# Patient Record
Sex: MALE | Race: BLACK OR AFRICAN AMERICAN | NOT HISPANIC OR LATINO
[De-identification: names, ages, dates, MRNs, and addresses within clinical notes are randomized per-mention and may not be internally consistent; named-entity substitution may affect disease eponyms.]

---

## 2019-06-26 ENCOUNTER — APPOINTMENT (OUTPATIENT)
Dept: PODIATRY | Facility: CLINIC | Age: 64
End: 2019-06-26
Payer: COMMERCIAL

## 2019-06-26 VITALS
SYSTOLIC BLOOD PRESSURE: 140 MMHG | DIASTOLIC BLOOD PRESSURE: 80 MMHG | HEIGHT: 75 IN | BODY MASS INDEX: 27.35 KG/M2 | WEIGHT: 220 LBS

## 2019-06-26 DIAGNOSIS — Z78.9 OTHER SPECIFIED HEALTH STATUS: ICD-10-CM

## 2019-06-26 PROBLEM — Z00.00 ENCOUNTER FOR PREVENTIVE HEALTH EXAMINATION: Status: ACTIVE | Noted: 2019-06-26

## 2019-06-26 PROCEDURE — L3000: CPT | Mod: LT

## 2019-06-26 PROCEDURE — 99204 OFFICE O/P NEW MOD 45 MIN: CPT | Mod: 25

## 2019-06-26 NOTE — HISTORY OF PRESENT ILLNESS
[FreeTextEntry1] : Location: right heel\par Duration: about 6 months\par Acute: \par Chronic: yes\par Past Tx: nothing, occaisional tylenol\par Exacerbated by: PDS, work, weight bearing\par

## 2019-06-26 NOTE — PROCEDURE
[FreeTextEntry1] : I had a lengthy and informative discussion with the patient today regarding plantar fasciitis. I explained to the patient that there are several etiologies as well contributing factors to this problem as well as what can aggravate it. It could include the presence or lack of of a heel spur, the type of foot type they have, the way they walk, it also could be related to the type of shoes they wear. I also made them understand that it could be a combination of all these problems together. I explained etiologies treatment options both conservative and surgical. I gave educational literature regarding this problem. Some of the treatment options as they range from conservative to aggressive include over-the-counter anti-inflammatories, prescription anti-inflammatories, custom shoes, custom orthotics, steroid injection, physical therapy, night splints, orthotripsy, and NSAIDS. There is also possible surgical intervention if all conservative measures failed to alleviate the problem. I did explain to the patient the type of shoe gear this should be wearing and gave him a copy of my plantar fascia stretching exercises with instructions to ice afterwards.\par i have dispensed a pair of heel cushions to the patient and advised the patient that accommodative support as well as elevation of both heels to help reduce symptoms\par A complete and thorough evaluation of the type of shoes they should be wearing and type of shoes for this time of year was discussed with patient.\par \par During the evaluation and management I had a lengthy discussion with the patient regarding benefits of functional foot orthoses. I explained to the patient the etiology and treatment options and one of them included the offloading and balancing of the painful portion of the foot. I explained the importance of balancing in offloading the painful area as part of the overall treatment process to advance healing. I have asked the patient to consider this as part of the treatment\par An overall gait examination was performed where the rearfoot and the midfoot and forefoot was evaluated both with the patient walking away and then towards me. then again repeated on their toes and their heels. I then explained my findings to the patient and then may benefit from a pair of orthotics and how the orthotics would control their symptoms\par a complete and comprehensive lower extremity biomechanical exam was performed., I evaluated the rear foot the subtalar joint the midfoot and forefoot during the comprehensive gait evaluation, muscle strength as well as muscle strength testing was incorporated into my findings when evaluating the lower extremity biomechanics., my findings were discussed reviewed and explained to the patient, I do think the patient would benefit from a pair of custom molded foot orthoses I have reviewed the benefits of the orthotics and advised they would benefit and have recommended they proceed with fabrication\par After a lengthy discussion with the patient regarding the possible benefits of orthotics and what we hope to achieve with them as it relates to their diagnosis the patient has agreed to be casted for the devices, The patient was then casted for a pair of custom functional foot orthoses with the subtalar joint in neutral, the forefoot locked, The patient was advised that they will be notified when the orthotics are returned from the laboratory, Should there be any questions or concerns they were advised to contact the office immediately, Educational literature regarding orthotics were dispensed, Included in the casting for orthotics was an overall gait exam and biomechanical evaluation\par

## 2019-06-26 NOTE — PHYSICAL EXAM
[General Appearance - Alert] : alert [General Appearance - In No Acute Distress] : in no acute distress [Full Pulse] : the pedal pulses are present [Edema] : there was no peripheral edema [FreeTextEntry1] : no bruising, no ecchymosis, no breaks in the skin, no evidence of plantar fibromas noted., no history of injury or trauma, reproducible pain upon palpation of the plantar aspect of the calcaneus without medial lateral squeeze pain, pain along the medial band of the plantar fascia and insertion of the plantar fascia to calcaneus, patient able to walk on the heels but does admit to pain, admits to post-static dyskinesia., admits to pain at the end of the day., no pain to the posterior aspect of the calcaneus, no evidence of Fidel's deformity, no void felt in the Achilles tendon, patient able to walk on there toes without pain, denies numbness tingling and sharp shooting pains, no evidence of a Tinel's sign upon percussion of the posterior tibial nerve\par \par \par  [Skin Color & Pigmentation] : normal skin color and pigmentation [Skin Turgor] : normal skin turgor [] : no rash [Deep Tendon Reflexes (DTR)] : deep tendon reflexes were 2+ and symmetric [Sensation] : the sensory exam was normal to light touch and pinprick [No Focal Deficits] : no focal deficits

## 2019-07-03 ENCOUNTER — RESULT REVIEW (OUTPATIENT)
Age: 64
End: 2019-07-03

## 2019-08-30 ENCOUNTER — APPOINTMENT (OUTPATIENT)
Dept: PODIATRY | Facility: CLINIC | Age: 64
End: 2019-08-30
Payer: COMMERCIAL

## 2019-08-30 VITALS
SYSTOLIC BLOOD PRESSURE: 130 MMHG | BODY MASS INDEX: 27.35 KG/M2 | DIASTOLIC BLOOD PRESSURE: 80 MMHG | HEIGHT: 75 IN | WEIGHT: 220 LBS

## 2019-08-30 PROCEDURE — 99213 OFFICE O/P EST LOW 20 MIN: CPT

## 2019-08-30 NOTE — PROCEDURE
[FreeTextEntry1] : Orthotic Evaluation the orthotics have been evaluated and I do feel that there is a good fit to the patient's foot and they have been made to my specifications. \par          Clinical Notes: The patient presents for dispensing of custom molded foot orthotics. I have removed the orthotics from the packaging and I have examined him. They do appear to be made as per my instructions regarding materials additions corrections. Upon placing him to the patient's foot they do appear to fit nicely. There is no material failure nor gapping. They were then trimmed to fit and placed inside the patient's shoe gear. There appears to be a good fit. Upon initial ambulation the patient has no initial complaints regarding pain off edges were tight fit. The patient did ambulate around the office for several minutes and had a favorable result. I then explained to the patient the normal break-in period. The paperwork supplied by the laboratory was reviewed with the patient. They understand a normal break-in period is to be gradual over several weeks. I've advised the patient that different, weird, and uncomfortable are certainly acceptable words in the beginning however pain, blister and callus are things that should not occur. Once the proper break-in was explained to the patient they were given an appointment to follow up.\par \par Decrease NSAIDS to once daily and d/c in 2 weeks

## 2019-08-30 NOTE — PHYSICAL EXAM
[General Appearance - Alert] : alert [General Appearance - In No Acute Distress] : in no acute distress [Full Pulse] : the pedal pulses are present [Edema] : there was no peripheral edema [FreeTextEntry1] : Examination shows less pain upon palpation of the plantar aspect of the heel, little pain on the medial band of the plantar fascia, the patient denies as much pain at the end of the day, the pain they currently experience is more in the form of post static dyskinesia. The patient states prior treatments have significantly improved the condition. No additional signs or symptoms regarding heel pain noted.

## 2020-04-26 ENCOUNTER — MESSAGE (OUTPATIENT)
Age: 65
End: 2020-04-26

## 2020-05-07 LAB
SARS-COV-2 IGG SERPL IA-ACNC: 0.1 INDEX
SARS-COV-2 IGG SERPL QL IA: NEGATIVE

## 2021-10-05 ENCOUNTER — RESULT REVIEW (OUTPATIENT)
Age: 66
End: 2021-10-05

## 2022-03-15 ENCOUNTER — RESULT REVIEW (OUTPATIENT)
Age: 67
End: 2022-03-15

## 2022-03-21 ENCOUNTER — APPOINTMENT (OUTPATIENT)
Dept: CARDIOLOGY | Facility: CLINIC | Age: 67
End: 2022-03-21

## 2022-03-22 ENCOUNTER — APPOINTMENT (OUTPATIENT)
Dept: SURGERY | Facility: CLINIC | Age: 67
End: 2022-03-22
Payer: COMMERCIAL

## 2022-03-22 VITALS — WEIGHT: 220 LBS | HEIGHT: 75 IN | BODY MASS INDEX: 27.35 KG/M2

## 2022-03-22 DIAGNOSIS — Z87.891 PERSONAL HISTORY OF NICOTINE DEPENDENCE: ICD-10-CM

## 2022-03-22 DIAGNOSIS — F17.200 NICOTINE DEPENDENCE, UNSPECIFIED, UNCOMPLICATED: ICD-10-CM

## 2022-03-22 PROCEDURE — 99203 OFFICE O/P NEW LOW 30 MIN: CPT

## 2022-03-22 RX ORDER — DICLOFENAC 35 MG/1
35 CAPSULE ORAL
Qty: 60 | Refills: 1 | Status: COMPLETED | COMMUNITY
Start: 2019-06-26 | End: 2022-03-22

## 2022-03-22 NOTE — ASSESSMENT
[FreeTextEntry1] : Referring physician Dr. Strauss\par \par Date: 3/22/2022\par \par Re: Referral right inguinal hernia\par \par This is a 66-year-old gentleman who had a lump and protrusion in the right groin for approximately a month and a half.  The patient seeked medical attention approximately week ago in our emergency room.  From that visit he has been referred here for further evaluation and management.\par \par Patient states he has discomfort in the right groin with prolonged standing or any type of physical activity such as lifting bending or moving.  He states he feels a slight protrusion and sees a slight protrusion in the right groin.  He has had no change in bowel habits no nausea no vomiting.  No change in appetite.\par \par Physical examination patient examined erect and supine position.  He is placed through multitude of Valsalva maneuvers.  He has a small right inguinal hernia noted on examination.  Is reducible in the supine position the right scrotum and testicles within normal limits.  There is laxity noted in left groin no obvious hernia noted.  A small hernia cannot be entirely ruled out.  Left scrotum and testicle within normal limits.\par \par The abdomen is soft nontender nondistended he has a very large hernia in the midline.\par \par Impression/plan, right inguinal hernia, right groin pain, incidental finding for ventral hernia: This is a 66-year-old gentleman symptomatic from a small to moderate size right inguinal hernia.  At this point he and I had a long conversation regarding the pros and cons of repairing a hernia at this setting versus observation.  The patient states he has discomfort with prolonged standing or activities and therefore he wants to have his hernia repaired.\par \par Patient will be scheduled for a laparoscopic right inguinal hernia repair with mesh, the left side will be examined due to the laxity noted on examination in the face occult hernia is seen it will be repaired at the same setting.  If no hernia seen a piece of mesh will be left indirect and will space for reinforcement.  With regard to the ventral hernia patient is asymptomatic but is fairly large and therefore he will return to the office in the near future to discuss pros and cons of repairing this hernia in a separate setting.\par \par The indications alternatives and complications of procedure discussed questions answered.  Written consent was obtained in the office today.

## 2022-04-05 ENCOUNTER — APPOINTMENT (OUTPATIENT)
Dept: CARDIOLOGY | Facility: CLINIC | Age: 67
End: 2022-04-05
Payer: COMMERCIAL

## 2022-04-05 ENCOUNTER — NON-APPOINTMENT (OUTPATIENT)
Age: 67
End: 2022-04-05

## 2022-04-05 VITALS
DIASTOLIC BLOOD PRESSURE: 90 MMHG | BODY MASS INDEX: 28.85 KG/M2 | WEIGHT: 232 LBS | RESPIRATION RATE: 12 BRPM | HEIGHT: 75 IN | HEART RATE: 78 BPM | SYSTOLIC BLOOD PRESSURE: 130 MMHG | OXYGEN SATURATION: 98 %

## 2022-04-05 DIAGNOSIS — M77.51 OTHER ENTHESOPATHY OF RT FOOT AND ANKLE: ICD-10-CM

## 2022-04-05 DIAGNOSIS — M79.671 PAIN IN RIGHT FOOT: ICD-10-CM

## 2022-04-05 DIAGNOSIS — M72.2 PLANTAR FASCIAL FIBROMATOSIS: ICD-10-CM

## 2022-04-05 DIAGNOSIS — Z87.39 PERSONAL HISTORY OF OTHER DISEASES OF THE MUSCULOSKELETAL SYSTEM AND CONNECTIVE TISSUE: ICD-10-CM

## 2022-04-05 PROCEDURE — 99204 OFFICE O/P NEW MOD 45 MIN: CPT

## 2022-04-05 PROCEDURE — 93000 ELECTROCARDIOGRAM COMPLETE: CPT

## 2022-04-05 NOTE — ASSESSMENT
[FreeTextEntry1] : 65 yo male with recently diagnosed hypertension and atrial fibrillation, who is currently pending right inguinal hernia repair with Dr. Ede Mccormack at [place] on 4/11/22.\par \par ECG today demonstrates atrial fibrillation, but rate controlled at 89 bpm.\par Will perform echocardiogram to assess LV function and structural heart disease. However, cardiac auscultation is not suggestive of severe valvular disease.\par After review of echo results, will determine if further cardiac work-up or intervention is clinically indicated.\par Will continue metoprolol succinate 50 mg po daily for rate control.\par [id number] score = 2. Will continue Xarelto 20 mg po daily for thromboembolic prophylaxis.\par \par BP is currently adequately controlled.\par Will continue metoprolol succinate 50 mg po daily.\par \par Geriatric sensitive cardiac risk index = 0.1% risk of perioperative MI or cardiac arrest\par Patient does not have any cardiac contraindications to pending surgery. Patient may proceed with acceptable cardiac risk and without further cardiac work-up prior to his surgery.\par Patient was advised to hold his Xarelto starting 4/8/22 in preparation for his surgery on 4/11/22. Patient to resume Xarelto post-op when safe from bleeding risk standpoint as per surgeon.

## 2022-04-05 NOTE — REVIEW OF SYSTEMS
[Palpitations] : palpitations [Negative] : Heme/Lymph [Nausea] : no nausea [Vomiting] : no vomiting [Heartburn] : no heartburn [Dysphagia] : no dysphagia [Blood in stool] : no blood in stoo

## 2022-04-05 NOTE — PHYSICAL EXAM
[Well Developed] : well developed [Well Nourished] : well nourished [No Acute Distress] : no acute distress [Normal Conjunctiva] : normal conjunctiva [Normal Venous Pressure] : normal venous pressure [No Carotid Bruit] : no carotid bruit [Normal Rate] : normal [Irregularly Irregular] : irregularly irregular [Normal S1] : normal S1 [Normal S2] : normal S2 [No Murmur] : no murmurs heard [No Pitting Edema] : no pitting edema present [Clear Lung Fields] : clear lung fields [Good Air Entry] : good air entry [No Respiratory Distress] : no respiratory distress  [Normal Gait] : normal gait [No Edema] : no edema [No Cyanosis] : no cyanosis [No Clubbing] : no clubbing [Moves all extremities] : moves all extremities [No Focal Deficits] : no focal deficits [Normal Speech] : normal speech [Alert and Oriented] : alert and oriented [Normal memory] : normal memory [S3] : no S3 [S4] : no S4 [Right Carotid Bruit] : no bruit heard over the right carotid [Left Carotid Bruit] : no bruit heard over the left carotid

## 2022-04-05 NOTE — HISTORY OF PRESENT ILLNESS
[FreeTextEntry1] : 67 yo male with recently diagnosed hypertension and atrial fibrillation at his recent office visit with his PMD (Dr. Strauss) ~ 2 weeks ago where he presented for pre-op evaluation for pending right inguinal hernia repair with Dr. Ede Mccormack at [facility] on 4/11/22. He was started on metoprolol succinate 50 mg po daily and Xarelto 20 mg po daily and advised to see a cardiologist for further evaluation/management. He reports he has been experiencing intermittent palpitations over the past 4-5 months, but Patient denies chest pain, dyspnea, syncope, edema, melena, hematochezia, or hematemesis. 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 \par \par Primary, Aj Strauss

## 2022-04-08 ENCOUNTER — RESULT REVIEW (OUTPATIENT)
Age: 67
End: 2022-04-08

## 2022-04-11 ENCOUNTER — APPOINTMENT (OUTPATIENT)
Dept: SURGERY | Facility: HOSPITAL | Age: 67
End: 2022-04-11

## 2022-04-11 ENCOUNTER — TRANSCRIPTION ENCOUNTER (OUTPATIENT)
Age: 67
End: 2022-04-11

## 2022-04-27 ENCOUNTER — APPOINTMENT (OUTPATIENT)
Dept: SURGERY | Facility: CLINIC | Age: 67
End: 2022-04-27
Payer: COMMERCIAL

## 2022-04-27 VITALS
TEMPERATURE: 98 F | SYSTOLIC BLOOD PRESSURE: 183 MMHG | HEIGHT: 75 IN | BODY MASS INDEX: 28.85 KG/M2 | WEIGHT: 232 LBS | HEART RATE: 90 BPM | OXYGEN SATURATION: 99 % | DIASTOLIC BLOOD PRESSURE: 115 MMHG

## 2022-04-27 DIAGNOSIS — K40.20 BILATERAL INGUINAL HERNIA, W/OUT OBSTRUCTION OR GANGRENE, NOT SPECIFIED AS RECURRENT: ICD-10-CM

## 2022-04-27 PROCEDURE — 99024 POSTOP FOLLOW-UP VISIT: CPT

## 2022-04-27 NOTE — PLAN
[FreeTextEntry1] : The patient is more than 2 weeks out from laparoscopic bilateral inguinal hernia repair.  He has no complaints.  His 4 incisions have healed well on exam.  His testicles are slightly swollen as expected.  There is no seroma or hydrocele appreciated.  There is no sign of infection.\par \par Plan: Patient will avoid heavy lifting for approximately another month.  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  Follow on as-needed basis.

## 2022-07-20 ENCOUNTER — APPOINTMENT (OUTPATIENT)
Dept: FAMILY MEDICINE | Facility: CLINIC | Age: 67
End: 2022-07-20

## 2022-07-20 VITALS
HEART RATE: 92 BPM | SYSTOLIC BLOOD PRESSURE: 170 MMHG | DIASTOLIC BLOOD PRESSURE: 80 MMHG | BODY MASS INDEX: 28.72 KG/M2 | WEIGHT: 231 LBS | HEIGHT: 75 IN

## 2022-07-20 VITALS — SYSTOLIC BLOOD PRESSURE: 145 MMHG | DIASTOLIC BLOOD PRESSURE: 100 MMHG

## 2022-07-20 DIAGNOSIS — S30.812A ABRASION OF PENIS, INITIAL ENCOUNTER: ICD-10-CM

## 2022-07-20 DIAGNOSIS — E66.3 OVERWEIGHT: ICD-10-CM

## 2022-07-20 DIAGNOSIS — Z76.89 PERSONS ENCOUNTERING HEALTH SERVICES IN OTHER SPECIFIED CIRCUMSTANCES: ICD-10-CM

## 2022-07-20 PROCEDURE — G0444 DEPRESSION SCREEN ANNUAL: CPT | Mod: 59

## 2022-07-20 PROCEDURE — 99204 OFFICE O/P NEW MOD 45 MIN: CPT | Mod: 25

## 2022-07-20 PROCEDURE — 36415 COLL VENOUS BLD VENIPUNCTURE: CPT

## 2022-07-20 RX ORDER — METOPROLOL SUCCINATE 50 MG/1
50 TABLET, EXTENDED RELEASE ORAL DAILY
Refills: 0 | Status: DISCONTINUED | COMMUNITY
End: 2022-07-20

## 2022-07-20 RX ORDER — RIVAROXABAN 15 MG-20MG
15 & 20 KIT ORAL
Qty: 51 | Refills: 0 | Status: DISCONTINUED | COMMUNITY
Start: 2022-03-16

## 2022-07-20 RX ORDER — RIVAROXABAN 20 MG/1
20 TABLET, FILM COATED ORAL DAILY
Refills: 0 | Status: DISCONTINUED | COMMUNITY
End: 2022-07-20

## 2022-07-20 NOTE — HEALTH RISK ASSESSMENT
[Good] : ~his/her~  mood as  good [No] : In the past 12 months have you used drugs other than those required for medical reasons? No [One fall no injury in past year] : Patient reported one fall in the past year without injury [0] : 2) Feeling down, depressed, or hopeless: Not at all (0) [Patient reported colonoscopy was normal] : Patient reported colonoscopy was normal [HIV test declined] : HIV test declined [Alone] : lives alone [Employed] : employed [Single] : single [# Of Children ___] : has [unfilled] children [Feels Safe at Home] : Feels safe at home [Fully functional (bathing, dressing, toileting, transferring, walking, feeding)] : Fully functional (bathing, dressing, toileting, transferring, walking, feeding) [Fully functional (using the telephone, shopping, preparing meals, housekeeping, doing laundry, using] : Fully functional and needs no help or supervision to perform IADLs (using the telephone, shopping, preparing meals, housekeeping, doing laundry, using transportation, managing medications and managing finances) [Smoke Detector] : smoke detector [Carbon Monoxide Detector] : carbon monoxide detector [PHQ-2 Negative - No further assessment needed] : PHQ-2 Negative - No further assessment needed [de-identified] : only on vacation [de-identified] : very rarely [de-identified] : active at work [de-identified] : Normal [EPT3Newfj] : 0 [Change in mental status noted] : No change in mental status noted [Reports changes in hearing] : Reports no changes in hearing [Reports changes in vision] : Reports no changes in vision [Reports changes in dental health] : Reports no changes in dental health [Guns at Home] : no guns at home [Sunscreen] : does not use sunscreen [Travel to Developing Areas] : does not  travel to developing areas [ColonoscopyDate] : 01/17

## 2022-07-20 NOTE — REVIEW OF SYSTEMS
[Lower Ext Edema] : lower extremity edema [Negative] : Genitourinary [Recent Change In Weight] : ~T no recent weight change [Chest Pain] : no chest pain [de-identified] : red area on the head of the penis

## 2022-07-20 NOTE — HISTORY OF PRESENT ILLNESS
After Visit Summary   9/22/2017    Jennifer Voss    MRN: [id number]           Patient Information     Date Of Birth          1985        Visit Information        Provider Department      9/22/2017 11:00 AM Maria Chaves PA-C [facility]        Today's Diagnoses     Tobacco abuse    -  1    Acute bronchitis, unspecified organism          Care Instructions      Bronchitis, Viral (Adult)    You have a viral bronchitis. Bronchitis is inflammation and swelling of the lining of the lungs. This is often caused by an infection. Symptoms include a dry, hacking cough that is worse at night. The cough may bring up yellow-green mucus. You may also feel short of breath or wheeze. Other symptoms may include tiredness, chest discomfort, and chills.  Bronchitis that is caused by a virus is not treated with antibiotics. Instead, medicines may be given to help relieve symptoms. Symptoms can last up to 2 weeks, although the cough may last much longer.  This illness is contagious during the first few days and is spread through the air by coughing and sneezing, or by direct contact (touching the sick person and then touching your own eyes, nose, or mouth).  Most viral illnesses resolve within 10 to 14 days with rest and simple home remedies, although they may sometimes last for several weeks.  Home care    If symptoms are severe, rest at home for the first 2 to 3 days. When you go back to your usual activities, don't let yourself get too tired.    Do not smoke. Also avoid being exposed to secondhand smoke.    You may use over-the-counter medicine to control fever or pain, unless another pain medicine was prescribed. (Note: If you have chronic liver or kidney disease or have ever had a stomach ulcer or gastrointestinal bleeding, talk with your healthcare provider before using these medicines. Also talk to your provider if you are taking medicine to prevent blood clots.) Aspirin should never be  given to anyone younger than 18 years of age who is ill with a viral infection or fever. It may cause severe liver or brain damage.    Your appetite may be poor, so a light diet is fine. Avoid dehydration by drinking 6 to 8 glasses of fluids per day (such as water, soft drinks, sports drinks, juices, tea, or soup). Extra fluids will help loosen secretions in the nose and lungs.    Over-the-counter cough, cold, and sore-throat medicines will not shorten the length of the illness, but they may help to reduce symptoms. (Note: Do not use decongestants if you have high blood pressure.)  Follow-up care  Follow up with your healthcare provider, or as advised. If you had an X-ray or ECG (electrocardiogram), a specialist will review it. You will be notified of any new findings that may affect your care.  Note: If you are age 65 or older, or if you have a chronic lung disease or condition that affects your immune system, or you smoke, talk to your healthcare provider about having pneumococcal vaccinations and a yearly influenza vaccination (flu shot).  When to seek medical advice  Call your healthcare provider right away if any of these occur:    Fever of 100.4 F (38 C) or higher    Coughing up increased amounts of colored sputum    Weakness, drowsiness, headache, facial pain, ear pain, or a stiff neck  Call 911, or get immediate medical care  Contact emergency services right away if any of these occur:    Coughing up blood    Worsening weakness, drowsiness, headache, or stiff neck    Trouble breathing, wheezing, or pain with breathing  Date Last Reviewed: 9/13/2015 2000-2017 [employer]. [address] [place] [address], [place] [address]. All rights reserved. This information is not intended as a substitute for professional medical care. Always follow your healthcare professional's instructions.                Follow-ups after your visit        Who to contact     Normal or non-critical lab and imaging results will be  "communicated to you by weeSPINhart, letter or phone within 4 business days after the clinic has received the results. If you do not hear from us within 7 days, please contact the clinic through Dorm[employer] or phone. If you have a critical or abnormal lab result, we will notify you by phone as soon as possible.  Submit refill requests through [employer] or call your pharmacy and they will forward the refill request to us. Please allow 3 business days for your refill to be completed.          If you need to speak with a  for additional information , please call: [phone]           Additional Information About Your Visit        Dormify Information     [employer] gives you secure access to your electronic health record. If you see a primary care provider, you can also send messages to your care team and make appointments. If you have questions, please call your primary care clinic.  If you do not have a primary care provider, please call [phone] and they will assist you.        Care EveryWhere ID     This is your Care EveryWhere ID. This could be used by other organizations to access your [facility] medical records  [id number]        Your Vitals Were     Pulse Temperature Height Last Period Pulse Oximetry Breastfeeding?    75 98.6  F (37  C) (Tympanic) 5' 4.49\" (1.638 m) 09/19/2017 (Approximate) 97% No    BMI (Body Mass Index)                   31.65 kg/m2            Blood Pressure from Last 3 Encounters:   09/22/17 127/85   08/01/17 116/85   07/19/17 120/72    Weight from Last 3 Encounters:   09/22/17 187 lb 3.2 oz (84.9 kg)   08/01/17 196 lb 3.2 oz (89 kg)   07/19/17 196 lb 3.2 oz (89 kg)              Today, you had the following     No orders found for display         Today's Medication Changes          These changes are accurate as of: 9/22/17 11:03 AM.  If you have any questions, ask your nurse or doctor.               Start taking these medicines.        Dose/Directions    guaiFENesin-codeine 100-10 " MG/5ML Soln solution   Commonly known as:  ROBITUSSIN AC   Used for:  Acute bronchitis, unspecified organism   Started by:  Maria Chaves PA-C        Dose:  1-2 tsp.   Take 5-10 mLs by mouth every 4 hours as needed for cough   Quantity:  473 mL   Refills:  0       varenicline 0.5 MG X 11 & 1 MG X 42 tablet   Commonly known as:  CHANTIX STARTING MONTH FENG   Used for:  Tobacco abuse   Started by:  Maria Chaves PA-C        Take 0.5 mg tab daily for 3 days, then 0.5 mg tab twice daily for 4 days, then 1 mg twice daily.   Quantity:  53 tablet   Refills:  0         These medicines have changed or have updated prescriptions.        Dose/Directions    * albuterol 108 (90 BASE) MCG/ACT Inhaler   Commonly known as:  PROAIR HFA/PROVENTIL HFA/VENTOLIN HFA   This may have changed:  Another medication with the same name was added. Make sure you understand how and when to take each.   Used for:  Bronchitis   Changed by:  Samira Gomez MD        Dose:  2 puff   Inhale 2 puffs into the lungs every 6 hours as needed for shortness of breath / dyspnea or wheezing   Quantity:  1 Inhaler   Refills:  0       * albuterol 108 (90 BASE) MCG/ACT Inhaler   Commonly known as:  PROAIR HFA/PROVENTIL HFA/VENTOLIN HFA   This may have changed:  You were already taking a medication with the same name, and this prescription was added. Make sure you understand how and when to take each.   Used for:  Acute bronchitis, unspecified organism   Changed by:  Maria Chaves PA-C        Dose:  2 puff   Inhale 2 puffs into the lungs every 6 hours as needed for shortness of breath / dyspnea or wheezing   Quantity:  1 Inhaler   Refills:  0       * Notice:  This list has 2 medication(s) that are the same as other medications prescribed for you. Read the directions carefully, and ask your doctor or other care provider to review them with you.         Where to get your medicines      These medications were sent to [id number] -  [place], [place] - [address]  [address], [place] [address]     Phone:  [phone]     albuterol 108 (90 BASE) MCG/ACT Inhaler    varenicline 0.5 MG X 11 & 1 MG X 42 tablet         Some of these will need a paper prescription and others can be bought over the counter.  Ask your nurse if you have questions.     Bring a paper prescription for each of these medications     guaiFENesin-codeine 100-10 MG/5ML Soln solution                Primary Care Provider Office Phone # Fax #    Fanny Camacho [phone] [address] [facility] [address]        Equal Access to Services     DEIRDRE ALVARADO : Hadii celena rubi hadasho Soолег, waaxda luqadaha, qaybta kaalmada aderockyyada, jeniffer mahan . So [facility] [phone].    ATENCIÓN: Si habla español, tiene a gutiérrez disposición servicios gratuitos de asistencia lingüística. Llame al [phone].    We comply with applicable federal civil rights laws and [place] laws. We do not discriminate on the basis of race, color, national origin, age, disability sex, sexual orientation or gender identity.            Thank you!     Thank you for choosing [facility]  for your care. Our goal is always to provide you with excellent care. Hearing back from our patients is one way we can continue to improve our services. Please take a few minutes to complete the written survey that you may receive in the mail after your visit with us. Thank you!             Your Updated Medication List - Protect others around you: Learn how to safely use, store and throw away your medicines at www.disposemymeds.org.          This list is accurate as of: 9/22/17 11:03 AM.  Always use your most recent med list.                   Brand Name Dispense Instructions for use Diagnosis    * albuterol 108 (90 BASE) MCG/ACT Inhaler    PROAIR HFA/PROVENTIL HFA/VENTOLIN HFA    1 Inhaler    Inhale 2 puffs into the lungs every 6 hours as needed for shortness  of breath / dyspnea or wheezing    Bronchitis       * albuterol 108 (90 BASE) MCG/ACT Inhaler    PROAIR HFA/PROVENTIL HFA/VENTOLIN HFA    1 Inhaler    Inhale 2 puffs into the lungs every 6 hours as needed for shortness of breath / dyspnea or wheezing    Acute bronchitis, unspecified organism       cyclobenzaprine 10 MG tablet    FLEXERIL    30 tablet    Take 1 tablet (10 mg) by mouth 3 times daily as needed for muscle spasms    Tension-type headache, not intractable, unspecified chronicity pattern       guaiFENesin-codeine 100-10 MG/5ML Soln solution    ROBITUSSIN AC    473 mL    Take 5-10 mLs by mouth every 4 hours as needed for cough    Acute bronchitis, unspecified organism       varenicline 0.5 MG X 11 & 1 MG X 42 tablet    CHANTIX STARTING MONTH FENG    53 tablet    Take 0.5 mg tab daily for 3 days, then 0.5 mg tab twice daily for 4 days, then 1 mg twice daily.    Tobacco abuse       * Notice:  This list has 2 medication(s) that are the same as other medications prescribed for you. Read the directions carefully, and ask your doctor or other care provider to review them with you.       [FreeTextEntry1] : New patient [de-identified] : Mr. BOBO WOOTEN is a 66 year old male here today to establish care. \par Hx of HTN and afib- started on Xarelto and metoprolol.- didn't take BP medication today.\par Ordered to have an ECHO\par c/o swelling in the lower extremities for a few months\par Covid vaccinations: 3\par c/o irritation of the glans penis x 1-2 months. \par

## 2022-07-20 NOTE — PHYSICAL EXAM
[No Acute Distress] : no acute distress [Well Nourished] : well nourished [Clear to Auscultation] : lungs were clear to auscultation bilaterally [Normal] : affect was normal and insight and judgment were intact [de-identified] : irregular [de-identified] : piting edema in both lower extremities R>L [de-identified] : abrasive,red area on the glans of the penis.

## 2022-07-21 LAB
25(OH)D3 SERPL-MCNC: 17.1 NG/ML
ALBUMIN SERPL ELPH-MCNC: 4.5 G/DL
ALP BLD-CCNC: 63 U/L
ALT SERPL-CCNC: 20 U/L
ANION GAP SERPL CALC-SCNC: 13 MMOL/L
AST SERPL-CCNC: 21 U/L
BASOPHILS # BLD AUTO: 0.02 K/UL
BASOPHILS NFR BLD AUTO: 0.5 %
BILIRUB SERPL-MCNC: 1.4 MG/DL
BUN SERPL-MCNC: 16 MG/DL
CALCIUM SERPL-MCNC: 9.7 MG/DL
CHLORIDE SERPL-SCNC: 105 MMOL/L
CHOLEST SERPL-MCNC: 145 MG/DL
CO2 SERPL-SCNC: 25 MMOL/L
CREAT SERPL-MCNC: 1.23 MG/DL
EGFR: 64 ML/MIN/1.73M2
EOSINOPHIL # BLD AUTO: 0.05 K/UL
EOSINOPHIL NFR BLD AUTO: 1.2 %
ESTIMATED AVERAGE GLUCOSE: 131 MG/DL
GLUCOSE SERPL-MCNC: 99 MG/DL
HBA1C MFR BLD HPLC: 6.2 %
HCT VFR BLD CALC: 46.9 %
HDLC SERPL-MCNC: 45 MG/DL
HGB BLD-MCNC: 15.2 G/DL
IMM GRANULOCYTES NFR BLD AUTO: 0.2 %
LDLC SERPL CALC-MCNC: 87 MG/DL
LYMPHOCYTES # BLD AUTO: 1.84 K/UL
LYMPHOCYTES NFR BLD AUTO: 42.6 %
MAN DIFF?: NORMAL
MCHC RBC-ENTMCNC: 29.7 PG
MCHC RBC-ENTMCNC: 32.4 GM/DL
MCV RBC AUTO: 91.8 FL
MONOCYTES # BLD AUTO: 0.38 K/UL
MONOCYTES NFR BLD AUTO: 8.8 %
NEUTROPHILS # BLD AUTO: 2.02 K/UL
NEUTROPHILS NFR BLD AUTO: 46.7 %
NONHDLC SERPL-MCNC: 100 MG/DL
PLATELET # BLD AUTO: 179 K/UL
POTASSIUM SERPL-SCNC: 4.5 MMOL/L
PROT SERPL-MCNC: 7 G/DL
PSA SERPL-MCNC: 0.73 NG/ML
RBC # BLD: 5.11 M/UL
RBC # FLD: 13.3 %
SODIUM SERPL-SCNC: 143 MMOL/L
TRIGL SERPL-MCNC: 64 MG/DL
WBC # FLD AUTO: 4.32 K/UL

## 2022-07-22 ENCOUNTER — RESULT REVIEW (OUTPATIENT)
Age: 67
End: 2022-07-22

## 2022-08-02 ENCOUNTER — RESULT REVIEW (OUTPATIENT)
Age: 67
End: 2022-08-02

## 2022-08-10 ENCOUNTER — APPOINTMENT (OUTPATIENT)
Dept: CARDIOLOGY | Facility: CLINIC | Age: 67
End: 2022-08-10

## 2022-08-10 PROCEDURE — 93306 TTE W/DOPPLER COMPLETE: CPT

## 2022-08-17 ENCOUNTER — NON-APPOINTMENT (OUTPATIENT)
Age: 67
End: 2022-08-17

## 2022-08-17 ENCOUNTER — APPOINTMENT (OUTPATIENT)
Dept: CARDIOLOGY | Facility: CLINIC | Age: 67
End: 2022-08-17

## 2022-08-17 VITALS
HEART RATE: 67 BPM | SYSTOLIC BLOOD PRESSURE: 147 MMHG | WEIGHT: 241 LBS | HEIGHT: 75 IN | BODY MASS INDEX: 29.97 KG/M2 | RESPIRATION RATE: 12 BRPM | OXYGEN SATURATION: 98 % | DIASTOLIC BLOOD PRESSURE: 87 MMHG

## 2022-08-17 PROCEDURE — 93000 ELECTROCARDIOGRAM COMPLETE: CPT

## 2022-08-17 PROCEDURE — 99214 OFFICE O/P EST MOD 30 MIN: CPT

## 2022-08-17 NOTE — REVIEW OF SYSTEMS
[Negative] : Heme/Lymph [Lower Ext Edema] : lower extremity edema [Nausea] : no nausea [Vomiting] : no vomiting [Heartburn] : no heartburn [Dysphagia] : no dysphagia [Blood in stool] : no blood in stoo

## 2022-08-17 NOTE — ASSESSMENT
[FreeTextEntry1] : 65 yo male with hypertension, atrial fibrillation diagnosed 3/2022, right calf DVT per 7/22/22 venous Doppler, and bilateral leg edema. \par Echo 8/10/22 demonstrated normal LV size with low normal LVEF 50%, normal diastolic function mod increased LA volume index (47 ml/m2), mod dilated RA, trace MR, mild AR, mild to mod TR, mild pulm HTN with PASP 39 mmHg, and mildly dilated aortic root (4.3 cm) and ascending aorta (4.4.cm).\par \par ECG today demonstrates atrial fibrillation, but rate controlled.\par Will continue metoprolol succinate 50 mg po daily for rate control.\par [id number] score = 2. Will continue Xarelto 20 mg po daily for thromboembolic prophylaxis.\par \par Right calf DVT per 7/22/22 venous Doppler while on Xarelto at the time, but patient reported in ER that he was not compliant with medication.\par Will continue Xarelto 20 mg po daily. \par Will consider repeat venous Doppler in late October 2022.\par \par BP is not currently adequately controlled on metoprolol succinate 50 mg po daily.\par Given bilateral leg edema, will add HCTZ 12.5 mg po daily for both edema and HTN management.\par RTC in 3-4 weeks for BP check and labs.

## 2022-08-17 NOTE — REASON FOR VISIT
Detail Level: Detailed Continue Regimen: Veltin qhs , moisturize prn \\nAczone QAM \\nTargadox until standing rx is complete\\nOCP\\nCetaphil Cleanser Otc Regimen: Cerave lotion [Hypertension] : hypertension [Other: ____] : [unfilled] Plan: Ok to d/c Spironolactone. If hormonal acne flares, can restart. 90 day supply ok to send to local pharmacy

## 2022-08-17 NOTE — HISTORY OF PRESENT ILLNESS
[FreeTextEntry1] : 65 yo male with hypertension and atrial fibrillation diagnosed 3/2022. Patient presents today for follow-up. He was recently diagnosed with DVT in right calf per 7/22/22 venous Doppler and was evaluated at [facility] where CTA chest was negative for pulmonary embolism. It was reported that he was not taking his Xarelto regularly and was advised to do so and have repeat US. Venous Doppler on 8/2/22 reported probable nonocclusive thrombus within medial gastrocnemius vein. He continues to report bilateral leg edema.\par \par Primary, Aj Strauss

## 2022-08-17 NOTE — PHYSICAL EXAM
[Well Developed] : well developed [Well Nourished] : well nourished [No Acute Distress] : no acute distress [Normal Conjunctiva] : normal conjunctiva [Normal Venous Pressure] : normal venous pressure [No Carotid Bruit] : no carotid bruit [Normal Rate] : normal [Irregularly Irregular] : irregularly irregular [Normal S1] : normal S1 [Normal S2] : normal S2 [No Murmur] : no murmurs heard [Clear Lung Fields] : clear lung fields [Good Air Entry] : good air entry [No Respiratory Distress] : no respiratory distress  [Normal Gait] : normal gait [No Edema] : no edema [No Cyanosis] : no cyanosis [No Clubbing] : no clubbing [Moves all extremities] : moves all extremities [No Focal Deficits] : no focal deficits [Normal Speech] : normal speech [Alert and Oriented] : alert and oriented [Normal memory] : normal memory [___ +] : bilateral [unfilled]U+ pretibial pitting edema [S3] : no S3 [S4] : no S4 [Right Carotid Bruit] : no bruit heard over the right carotid [Left Carotid Bruit] : no bruit heard over the left carotid

## 2022-08-17 NOTE — CARDIOLOGY SUMMARY
[de-identified] : \par 8/17/22 ECG: Atrial fibrillation, rate 79 bpm, PVC, delayed R wave progression\par 4/5/22 ECG: Atrial fibrillation, rate 89\par  [de-identified] : \par 8/10/22 Echo: Normal LV size with low normal LVEF 50%. Normal diastolic function. Mod increased LA volume index (47 ml/m2). Mod dilated RA. Trace MR. Mild AR. Mild to mod TR. Mild pulm HTN with PASP 39 mmHg. Mild FL. Mildly dilated aortic root (4.3 cm) and ascending aorta (4.4.cm).\par  [de-identified] : \par 7/22/22 Chest CTA (at [facility]): No evidence of acute pulmonary emboli or other acute intrathoracic pathology.  \par  [de-identified] : \par 8/3/22 Lower extremity venous Doppler: Probable superficial thrombosis with nonocclusive thrombus within medial gastrocnemius vein.\par \par 7/23/22 Lower extremity venous Doppler: Right calf deep venous thrombosis involving intramuscular vein.

## 2022-09-07 ENCOUNTER — APPOINTMENT (OUTPATIENT)
Dept: CARDIOLOGY | Facility: CLINIC | Age: 67
End: 2022-09-07

## 2022-09-07 VITALS
HEART RATE: 61 BPM | WEIGHT: 235 LBS | HEIGHT: 75 IN | OXYGEN SATURATION: 100 % | DIASTOLIC BLOOD PRESSURE: 80 MMHG | SYSTOLIC BLOOD PRESSURE: 139 MMHG | RESPIRATION RATE: 12 BRPM | BODY MASS INDEX: 29.22 KG/M2

## 2022-09-07 DIAGNOSIS — R60.9 EDEMA, UNSPECIFIED: ICD-10-CM

## 2022-09-07 DIAGNOSIS — Z01.810 ENCOUNTER FOR PREPROCEDURAL CARDIOVASCULAR EXAMINATION: ICD-10-CM

## 2022-09-07 PROCEDURE — 99214 OFFICE O/P EST MOD 30 MIN: CPT

## 2022-09-07 PROCEDURE — 36415 COLL VENOUS BLD VENIPUNCTURE: CPT

## 2022-09-07 RX ORDER — METOPROLOL SUCCINATE 50 MG/1
50 TABLET, EXTENDED RELEASE ORAL
Qty: 90 | Refills: 2 | Status: ACTIVE | COMMUNITY
Start: 1900-01-01 | End: 1900-01-01

## 2022-09-07 NOTE — ASSESSMENT
[FreeTextEntry1] : 67 yo male with hypertension, atrial fibrillation diagnosed 3/2022, right calf DVT per 7/22/22 venous Doppler -> repeat venous Doppler on 8/2/22 with probable nonocclusive thrombus within medial gastrocnemius vein. \par Echo 8/10/22 demonstrated normal LV size with low normal LVEF 50%, normal diastolic function mod increased LA volume index (47 ml/m2), mod dilated RA, trace MR, mild AR, mild to mod TR, mild pulm HTN with PASP 39 mmHg, and mildly dilated aortic root (4.3 cm) and ascending aorta (4.4.cm).\par \par BP is currently adequately controlled on metoprolol succinate 50 mg po daily and HCTZ 12.5 mg po daily.\par Will check BMP today. \par \par Will continue metoprolol succinate 50 mg po daily for afib rate control.\par [id number] score = 2. Will continue Xarelto 20 mg po daily for thromboembolic prophylaxis.\par Will check CBC todasy.\par \par Right calf DVT per 7/22/22 venous Doppler while on Xarelto at the time, but patient reported in ER that he was not compliant with medication.\par Will continue Xarelto 20 mg po daily. \par Will consider repeat venous Doppler after 10/2022 (3 months of anticoagulation).

## 2022-09-07 NOTE — CARDIOLOGY SUMMARY
[de-identified] : \par 8/17/22 ECG: Atrial fibrillation, rate 79 bpm, PVC, delayed R wave progression\par 4/5/22 ECG: Atrial fibrillation, rate 89\par  [de-identified] : \par 8/10/22 Echo: Normal LV size with low normal LVEF 50%. Normal diastolic function. Mod increased LA volume index (47 ml/m2). Mod dilated RA. Trace MR. Mild AR. Mild to mod TR. Mild pulm HTN with PASP 39 mmHg. Mild MI. Mildly dilated aortic root (4.3 cm) and ascending aorta (4.4.cm).\par  [de-identified] : \par 7/22/22 Chest CTA (at [facility]): No evidence of acute pulmonary emboli or other acute intrathoracic pathology.  \par  [de-identified] : \par 8/3/22 Lower extremity venous Doppler: Probable superficial thrombosis with nonocclusive thrombus within medial gastrocnemius vein.\par \par 7/23/22 Lower extremity venous Doppler: Right calf deep venous thrombosis involving intramuscular vein.

## 2022-09-07 NOTE — REVIEW OF SYSTEMS
[Lower Ext Edema] : lower extremity edema [Negative] : Heme/Lymph [Nausea] : no nausea [Vomiting] : no vomiting [Heartburn] : no heartburn [Dysphagia] : no dysphagia [Blood in stool] : no blood in stoo

## 2022-09-07 NOTE — HISTORY OF PRESENT ILLNESS
[FreeTextEntry1] : 67 yo male with hypertension, atrial fibrillation diagnosed 3/2022, and right leg DVT per 7/22/22 venous Doppler -> repeat venous Doppler on 8/2/22 reported probable nonocclusive thrombus within medial gastrocnemius vein. Patient denies chest pain, dyspnea, palpitations, syncope, melena, hematochezia, or hematemesis. He only reports right leg edema since addition of HCTZ 12.5 mg po daily on 8/17/22. \par \par Primary, Aj Strauss

## 2022-09-07 NOTE — PHYSICAL EXAM
[Well Developed] : well developed [Well Nourished] : well nourished [No Acute Distress] : no acute distress [Normal Conjunctiva] : normal conjunctiva [Normal Venous Pressure] : normal venous pressure [No Carotid Bruit] : no carotid bruit [Normal Rate] : normal [Irregularly Irregular] : irregularly irregular [Normal S1] : normal S1 [Normal S2] : normal S2 [No Murmur] : no murmurs heard [Clear Lung Fields] : clear lung fields [Good Air Entry] : good air entry [No Respiratory Distress] : no respiratory distress  [Normal Gait] : normal gait [No Cyanosis] : no cyanosis [No Clubbing] : no clubbing [Moves all extremities] : moves all extremities [No Focal Deficits] : no focal deficits [Normal Speech] : normal speech [Alert and Oriented] : alert and oriented [Normal memory] : normal memory [___+] : [unfilled]U+ pretibial pitting edema on the right [S3] : no S3 [S4] : no S4 [Right Carotid Bruit] : no bruit heard over the right carotid [Left Carotid Bruit] : no bruit heard over the left carotid

## 2022-09-08 LAB
ANION GAP SERPL CALC-SCNC: 13 MMOL/L
BASOPHILS # BLD AUTO: 0.03 K/UL
BASOPHILS NFR BLD AUTO: 0.7 %
BUN SERPL-MCNC: 16 MG/DL
CALCIUM SERPL-MCNC: 9.8 MG/DL
CHLORIDE SERPL-SCNC: 102 MMOL/L
CO2 SERPL-SCNC: 26 MMOL/L
CREAT SERPL-MCNC: 1.23 MG/DL
EGFR: 64 ML/MIN/1.73M2
EOSINOPHIL # BLD AUTO: 0.05 K/UL
EOSINOPHIL NFR BLD AUTO: 1.1 %
GLUCOSE SERPL-MCNC: 138 MG/DL
HCT VFR BLD CALC: 47 %
HGB BLD-MCNC: 14.9 G/DL
IMM GRANULOCYTES NFR BLD AUTO: 0.2 %
LYMPHOCYTES # BLD AUTO: 1.92 K/UL
LYMPHOCYTES NFR BLD AUTO: 43.2 %
MAN DIFF?: NORMAL
MCHC RBC-ENTMCNC: 29.3 PG
MCHC RBC-ENTMCNC: 31.7 GM/DL
MCV RBC AUTO: 92.3 FL
MONOCYTES # BLD AUTO: 0.42 K/UL
MONOCYTES NFR BLD AUTO: 9.5 %
NEUTROPHILS # BLD AUTO: 2.01 K/UL
NEUTROPHILS NFR BLD AUTO: 45.3 %
PLATELET # BLD AUTO: 174 K/UL
POTASSIUM SERPL-SCNC: 4.3 MMOL/L
RBC # BLD: 5.09 M/UL
RBC # FLD: 13 %
SODIUM SERPL-SCNC: 142 MMOL/L
WBC # FLD AUTO: 4.44 K/UL

## 2023-03-22 ENCOUNTER — TRANSCRIPTION ENCOUNTER (OUTPATIENT)
Age: 68
End: 2023-03-22

## 2023-03-24 ENCOUNTER — NON-APPOINTMENT (OUTPATIENT)
Age: 68
End: 2023-03-24

## 2023-04-27 ENCOUNTER — TRANSCRIPTION ENCOUNTER (OUTPATIENT)
Age: 68
End: 2023-04-27

## 2023-05-10 ENCOUNTER — APPOINTMENT (OUTPATIENT)
Dept: CARDIOLOGY | Facility: CLINIC | Age: 68
End: 2023-05-10
Payer: COMMERCIAL

## 2023-05-10 VITALS
DIASTOLIC BLOOD PRESSURE: 90 MMHG | BODY MASS INDEX: 29.22 KG/M2 | SYSTOLIC BLOOD PRESSURE: 158 MMHG | OXYGEN SATURATION: 99 % | HEART RATE: 86 BPM | HEIGHT: 75 IN | WEIGHT: 235 LBS | RESPIRATION RATE: 14 BRPM

## 2023-05-10 DIAGNOSIS — Z86.718 PERSONAL HISTORY OF OTHER VENOUS THROMBOSIS AND EMBOLISM: ICD-10-CM

## 2023-05-10 PROCEDURE — 99214 OFFICE O/P EST MOD 30 MIN: CPT

## 2023-05-10 PROCEDURE — 93000 ELECTROCARDIOGRAM COMPLETE: CPT

## 2023-05-10 RX ORDER — RIVAROXABAN 20 MG/1
20 TABLET, FILM COATED ORAL
Qty: 90 | Refills: 0 | Status: ACTIVE | COMMUNITY
Start: 1900-01-01 | End: 1900-01-01

## 2023-05-10 RX ORDER — HYDROCHLOROTHIAZIDE 12.5 MG/1
12.5 TABLET ORAL
Qty: 90 | Refills: 2 | Status: DISCONTINUED | COMMUNITY
Start: 2022-08-17 | End: 2023-05-10

## 2023-05-10 NOTE — PHYSICAL EXAM
[Well Developed] : well developed [Well Nourished] : well nourished [No Acute Distress] : no acute distress [Normal Conjunctiva] : normal conjunctiva [Normal Venous Pressure] : normal venous pressure [No Carotid Bruit] : no carotid bruit [Normal Rate] : normal [Irregularly Irregular] : irregularly irregular [Normal S1] : normal S1 [Normal S2] : normal S2 [No Murmur] : no murmurs heard [Clear Lung Fields] : clear lung fields [Good Air Entry] : good air entry [No Respiratory Distress] : no respiratory distress  [No Cyanosis] : no cyanosis [No Clubbing] : no clubbing [Moves all extremities] : moves all extremities [No Focal Deficits] : no focal deficits [Normal Speech] : normal speech [Alert and Oriented] : alert and oriented [Normal memory] : normal memory [S3] : no S3 [S4] : no S4 [Right Carotid Bruit] : no bruit heard over the right carotid [Left Carotid Bruit] : no bruit heard over the left carotid

## 2023-05-10 NOTE — HISTORY OF PRESENT ILLNESS
[FreeTextEntry1] : 68 yo male with hypertension, atrial fibrillation diagnosed 3/2022, and right leg DVT per 7/22/22 venous Doppler -> repeat venous Doppler on 8/2/22 reported probable nonocclusive thrombus within medial gastrocnemius vein. Patient denies chest pain, dyspnea, palpitations, syncope, melena, hematochezia, or hematemesis. His HCTZ was discontinued on 3/24/23 due to reported nausea. He states that his nausea has resolved off this medication.\par \par Primary, Aj Strauss

## 2023-05-10 NOTE — CARDIOLOGY SUMMARY
[de-identified] : \par 5/10/23 ECG: Atrial fibrillation, rate 86 bpm, PVCs, poor R wave progression\par 8/17/22 ECG: Atrial fibrillation, rate 79 bpm, PVC, delayed R wave progression\par 4/5/22 ECG: Atrial fibrillation, rate 89\par  [de-identified] : \par 8/10/22 Echo: Normal LV size with low normal LVEF 50%. Normal diastolic function. Mod increased LA volume index (47 ml/m2). Mod dilated RA. Trace MR. Mild AR. Mild to mod TR. Mild pulm HTN with PASP 39 mmHg. Mild WI. Mildly dilated aortic root (4.3 cm) and ascending aorta (4.4.cm).\par  [de-identified] : \par 7/22/22 Chest CTA (at [facility]): No evidence of acute pulmonary emboli or other acute intrathoracic pathology.  \par  [de-identified] : \par 8/3/22 Lower extremity venous Doppler: Probable superficial thrombosis with nonocclusive thrombus within medial gastrocnemius vein.\par \par 7/23/22 Lower extremity venous Doppler: Right calf deep venous thrombosis involving intramuscular vein.

## 2023-08-09 ENCOUNTER — APPOINTMENT (OUTPATIENT)
Dept: CARDIOLOGY | Facility: CLINIC | Age: 68
End: 2023-08-09
Payer: COMMERCIAL

## 2023-08-09 VITALS
OXYGEN SATURATION: 99 % | HEART RATE: 62 BPM | DIASTOLIC BLOOD PRESSURE: 90 MMHG | BODY MASS INDEX: 29.22 KG/M2 | WEIGHT: 235 LBS | HEIGHT: 75 IN | SYSTOLIC BLOOD PRESSURE: 147 MMHG | RESPIRATION RATE: 14 BRPM

## 2023-08-09 DIAGNOSIS — I71.21 ANEURYSM OF THE ASCENDING AORTA, WITHOUT RUPTURE: ICD-10-CM

## 2023-08-09 DIAGNOSIS — I10 ESSENTIAL (PRIMARY) HYPERTENSION: ICD-10-CM

## 2023-08-09 DIAGNOSIS — I48.91 UNSPECIFIED ATRIAL FIBRILLATION: ICD-10-CM

## 2023-08-09 PROCEDURE — 93000 ELECTROCARDIOGRAM COMPLETE: CPT

## 2023-08-09 PROCEDURE — 99214 OFFICE O/P EST MOD 30 MIN: CPT

## 2023-08-09 PROCEDURE — 36415 COLL VENOUS BLD VENIPUNCTURE: CPT

## 2023-08-09 NOTE — PHYSICAL EXAM
[Well Developed] : well developed [Well Nourished] : well nourished [No Acute Distress] : no acute distress [Normal Conjunctiva] : normal conjunctiva [Normal Venous Pressure] : normal venous pressure [No Carotid Bruit] : no carotid bruit [Normal Rate] : normal [Irregularly Irregular] : irregularly irregular [Normal S1] : normal S1 [Normal S2] : normal S2 [S3] : no S3 [S4] : no S4 [No Murmur] : no murmurs heard [___+] : [unfilled]U+ pretibial pitting edema on the right [Right Carotid Bruit] : no bruit heard over the right carotid [Left Carotid Bruit] : no bruit heard over the left carotid [Clear Lung Fields] : clear lung fields [Good Air Entry] : good air entry [No Respiratory Distress] : no respiratory distress  [No Cyanosis] : no cyanosis [No Clubbing] : no clubbing [Moves all extremities] : moves all extremities [No Focal Deficits] : no focal deficits [Normal Speech] : normal speech [Alert and Oriented] : alert and oriented [Normal memory] : normal memory

## 2023-08-09 NOTE — CARDIOLOGY SUMMARY
[de-identified] : 8/9/23 ECG: Atrial fibrillation, rate 59 bpm, poor R wave progression 5/10/23 ECG: Atrial fibrillation, rate 86 bpm, PVCs, poor R wave progression 8/17/22 ECG: Atrial fibrillation, rate 79 bpm, PVC, delayed R wave progression [de-identified] :  8/10/22 Echo: Normal LV size with low normal LVEF 50%. Normal diastolic function. Mod increased LA volume index (47 ml/m2). Mod dilated RA. Trace MR. Mild AR. Mild to mod TR. Mild pulm HTN with PASP 39 mmHg. Mild CT. Mildly dilated aortic root (4.3 cm) and ascending aorta (4.4.cm). [de-identified] : 7/22/22 Chest CTA (at [place]): No evidence of acute pulmonary emboli or other acute intrathoracic pathology.    [de-identified] : 8/3/22 Lower extremity venous Doppler: Probable superficial thrombosis with nonocclusive thrombus within medial gastrocnemius vein. 7/23/22 Lower extremity venous Doppler: Right calf deep venous thrombosis involving intramuscular vein.

## 2023-08-09 NOTE — REVIEW OF SYSTEMS
[SOB] : no shortness of breath [Dyspnea on exertion] : not dyspnea during exertion [Chest Discomfort] : no chest discomfort [Lower Ext Edema] : lower extremity edema [Palpitations] : no palpitations [Syncope] : no syncope [Nausea] : no nausea [Vomiting] : no vomiting [Heartburn] : no heartburn [Dysphagia] : no dysphagia [Blood in stool] : no blood in stoo [Negative] : Heme/Lymph

## 2023-08-09 NOTE — ASSESSMENT
[FreeTextEntry1] : 68 yo male with hypertension, atrial fibrillation diagnosed 3/2022, right leg DVT per 7/22/22 venous Doppler -> repeat venous Doppler on 8/2/22 reported probable nonocclusive thrombus within medial gastrocnemius vein, and dilated aortic root (4.3 cm)/ascending aortic aneurysm (4.4 cm) per 8/2022 echo.  Echo on 8/10/22 demonstrated normal LV size with low normal LVEF 50%, normal diastolic function mod increased LA volume index (47 ml/m2), mod dilated RA, trace MR, mild AR, mild to mod TR, mild pulm HTN with PASP 39 mmHg, and mildly dilated aortic root (4.3 cm) and ascending aorta (4.4.cm).  ECG today demonstrated rate controlled atrial fibrillation. Will continue metoprolol succinate 50 mg po daily for afib rate control. [id number] score = 2. Will continue Xarelto 20 mg po daily for thromboembolic prophylaxis. Will check labs today (CBC, CMP) and call with results.  BP is currently controlled. Will continue metoprolol succinate 50 mg po daily and amlodipine 5 mg po daily.  Will repeat echocardiogram after next appointment in 6-8 months to reassess aortic root and ascending aortic aneurysm size.

## 2023-08-10 ENCOUNTER — NON-APPOINTMENT (OUTPATIENT)
Age: 68
End: 2023-08-10

## 2023-08-10 LAB
ALBUMIN SERPL ELPH-MCNC: 4.4 G/DL
ALP BLD-CCNC: 53 U/L
ALT SERPL-CCNC: 17 U/L
ANION GAP SERPL CALC-SCNC: 11 MMOL/L
AST SERPL-CCNC: 17 U/L
BILIRUB SERPL-MCNC: 1 MG/DL
BUN SERPL-MCNC: 13 MG/DL
CALCIUM SERPL-MCNC: 9.7 MG/DL
CHLORIDE SERPL-SCNC: 105 MMOL/L
CO2 SERPL-SCNC: 28 MMOL/L
CREAT SERPL-MCNC: 1.13 MG/DL
EGFR: 71 ML/MIN/1.73M2
GLUCOSE SERPL-MCNC: 114 MG/DL
POTASSIUM SERPL-SCNC: 4.8 MMOL/L
PROT SERPL-MCNC: 6.7 G/DL
SODIUM SERPL-SCNC: 144 MMOL/L

## 2023-11-10 LAB
HCT VFR BLD CALC: 47.6 %
HGB BLD-MCNC: 15 G/DL
MCHC RBC-ENTMCNC: 29.8 PG
MCHC RBC-ENTMCNC: 31.5 GM/DL
MCV RBC AUTO: 94.6 FL
PLATELET # BLD AUTO: 165 K/UL
RBC # BLD: 5.03 M/UL
RBC # FLD: 13.5 %
WBC # FLD AUTO: 4.3 K/UL

## 2024-04-04 ENCOUNTER — RX RENEWAL (OUTPATIENT)
Age: 69
End: 2024-04-04

## 2024-04-04 RX ORDER — RIVAROXABAN 20 MG/1
20 TABLET, FILM COATED ORAL
Qty: 90 | Refills: 3 | Status: ACTIVE | COMMUNITY
Start: 2023-05-10 | End: 1900-01-01

## 2024-04-12 ENCOUNTER — RX RENEWAL (OUTPATIENT)
Age: 69
End: 2024-04-12

## 2024-04-12 RX ORDER — AMLODIPINE BESYLATE 5 MG/1
5 TABLET ORAL DAILY
Qty: 90 | Refills: 3 | Status: ACTIVE | COMMUNITY
Start: 2023-05-10 | End: 1900-01-01

## 2025-03-25 NOTE — HISTORY OF PRESENT ILLNESS
[FreeTextEntry1] : Location: right heel\par Duration: about 6 months\par prresents to  orthotics and reports 90% relief Consultant